# Patient Record
(demographics unavailable — no encounter records)

---

## 2024-10-08 NOTE — PROCEDURE
[Good Fit] : fits well [Pessary Inserted] : inserted [Straight Catheterization] : insertion of a straight catheter [Urinary Tract Infection] : a urinary tract infection [___ Fr Straight Tip] : a [unfilled] in [place] straight tip catheter [None] : there were no complications with the catheter insertion [Clear] : clear [Culture] : culture [Refit] : refit is not needed [Erosion] : no evidence of erosion [Erythema] : no erythema [Discharge] : no vaginal discharge [Infection] : no evidence of infection [FreeTextEntry1] : Vera OMNTANEZ #3 [FreeTextEntry8] : [facility] reviewed

## 2024-10-08 NOTE — HISTORY OF PRESENT ILLNESS
[Uterine Prolapse] : no [Vaginal Wall Prolapse] : no [Unable To Restrain Bowel Movement] : no [Urinary Tract Infection] : no [Urinary Frequency] : no [Feelings Of Urinary Urgency] : no [Pain During Urination (Dysuria)] : no [Constipation Obstructed Defecation] : no [Stool Visible Blood] : no [Incomplete Emptying Of Stool] : no [] : no [Pelvic Pain] : no [Vaginal Pain] : no [Rectal Pain] : no [de-identified] : None with pessary [FreeTextEntry5] : None with pessary [de-identified] : 2-3x/night, not bothersome  [de-identified] : Occasionally  [de-identified] : Not sexually active  [FreeTextEntry1] : Marilyn, 72y/o presents for follow up Stage 4 complete uterovaginal prolapse, cystocele, rectocele. She was last seen on 7/9/24 and had a gellhorn LS #3 reinserted. She had previously tried a RS pessary that fell out. She has been using vaginal estrogen twice a week, no bleeding. She is not sexually active. She denies any discharge, pain, prolapse past the pessary, problems with urinating/defecating. She is happy with her pessary.   PMH: Pancreatic cysts, arrhythmia, chronic lower back pain, hemorrhoids PSH: Cholecystectomy, tonsillectomy Soc: Never smoker, no alcohol consumption, not currently employed, no history of chemical exposures,   FHx: Colon cancer (father, grandfather), uterine? or cervical cancer (grandmother), no history of bladder or  malignancies  Daily fluid intake includes 1 cup decaf coffee + 6-7 cups of water. She drinks until she goes to sleep and reports nocturia 2-3x/night that is not bothersome.

## 2024-10-08 NOTE — DISCUSSION/SUMMARY
[FreeTextEntry1] : Marilyn is a 72yo with Stage 4 uterovaginal prolapse, cystocele, rectocele. She has a gellhorn LS #3 pessary in place. Pessary precautions and instructions reviewed. She will continue using vaginal estrogen, refills sent. we discussed continuing the vaginal estrogen since it has improved her tissue quality significantly. Risks and benefits reviewed. She also uses clotrimazole-betamethasone as needed externally for vulvar irritation. For her urinary frequency, cath urine was sent for culture today.   RTO 3 months for pessary check.

## 2024-10-08 NOTE — PHYSICAL EXAM
[Chaperone Present] : A chaperone was present in the examining room during all aspects of the physical examination [Labia Majora] : were normal [Labia Minora] : were normal [Normal Appearance] : general appearance was normal [Atrophy] : atrophy [Rectocele] : a rectocele [Cystocele] : a cystocele [Uterine Prolapse] : uterine prolapse [No Bleeding] : there was no active vaginal bleeding [Normal] : no abnormalities [Exam Deferred] : was deferred [82374] : A chaperone was present during the pelvic exam. [FreeTextEntry2] : Ashley [FreeTextEntry1] : General: Well, appearing. Alert and orientated. No acute distress HEENT: Normocephalic, atraumatic and extraocular muscles appear to be intact  Neck: Full range of motion, no obvious lymphadenopathy, deformities, or masses noted  Respiratory: Speaking in full sentences comfortably, normal work of breathing and no cough during visit Musculoskeletal: full range of motion  Extremities: No upper extremity edema noted Skin: no obvious rash or skin lesions Neuro: Orientated X 3, speech is fluent, normal rate Psych: Normal mood and affect [Tenderness] : ~T no ~M abdominal tenderness observed [Distended] : not distended

## 2025-01-08 NOTE — PHYSICAL EXAM
[Chaperone Present] : A chaperone was present in the examining room during all aspects of the physical examination [Labia Majora] : were normal [Labia Minora] : were normal [Normal Appearance] : general appearance was normal [Atrophy] : atrophy [Rectocele] : a rectocele [Cystocele] : a cystocele [Uterine Prolapse] : uterine prolapse [No Bleeding] : there was no active vaginal bleeding [Normal] : no abnormalities [Exam Deferred] : was deferred [22166] : A chaperone was present during the pelvic exam. [FreeTextEntry2] : Ashley [FreeTextEntry1] : General: Well, appearing. Alert and orientated. No acute distress HEENT: Normocephalic, atraumatic and extraocular muscles appear to be intact  Neck: Full range of motion, no obvious lymphadenopathy, deformities, or masses noted  Respiratory: Speaking in full sentences comfortably, normal work of breathing and no cough during visit Musculoskeletal: full range of motion  Extremities: No upper extremity edema noted Skin: no obvious rash or skin lesions Neuro: Orientated X 3, speech is fluent, normal rate Psych: Normal mood and affect [Tenderness] : ~T no ~M abdominal tenderness observed [Distended] : not distended

## 2025-01-08 NOTE — DISCUSSION/SUMMARY
[FreeTextEntry1] : Marilyn is a 74yo with Stage 4 uterovaginal prolapse, cystocele, rectocele. She has a gellhorn LS #3 pessary in place. We discussed increasing the size of her pessary or trying a different type since she had some residual prolapse. She is afraid of pain, bleeding with a larger pessary and wants to continue using this one. We discussed that as long as she does not develop urinary obstruction then she can continue using the smaller pessary. Pessary precautions and instructions reviewed. She will continue using vaginal estrogen, refills sent.   RTO 3 months for pessary check.

## 2025-01-08 NOTE — HISTORY OF PRESENT ILLNESS
[Uterine Prolapse] : no [Vaginal Wall Prolapse] : no [Unable To Restrain Bowel Movement] : no [Urinary Tract Infection] : no [Urinary Frequency] : no [Feelings Of Urinary Urgency] : no [Pain During Urination (Dysuria)] : no [Constipation Obstructed Defecation] : no [Stool Visible Blood] : no [Incomplete Emptying Of Stool] : no [] : no [Pelvic Pain] : no [Vaginal Pain] : no [Rectal Pain] : no [de-identified] : None with pessary [FreeTextEntry5] : None with pessary [de-identified] : 2-3x/night, not bothersome  [de-identified] : Occasionally  [de-identified] : Not sexually active  [FreeTextEntry1] : Marilyn, 74y/o presents for follow up Stage 4 complete uterovaginal prolapse, cystocele, rectocele. She was last seen on 10/8/24 and has a gellhorn LS #3. She had previously tried a RS pessary that fell out. She has been using vaginal estrogen twice a week, no bleeding. She is not sexually active. She denies any discharge, pain, problems with urinating/defecating. She has a little prolapse past the pessary but is happy with it.   PMH: Pancreatic cysts, arrhythmia, chronic lower back pain, hemorrhoids PSH: Cholecystectomy, tonsillectomy Soc: Never smoker, no alcohol consumption, not currently employed, no history of chemical exposures,   FHx: Colon cancer (father, grandfather), uterine? or cervical cancer (grandmother), no history of bladder or  malignancies  Daily fluid intake includes 1 cup decaf coffee + 6-7 cups of water. She drinks until she goes to sleep and reports nocturia 2-3x/night that is not bothersome.

## 2025-01-08 NOTE — PROCEDURE
[Straight Catheterization] : insertion of a straight catheter [Urinary Tract Infection] : a urinary tract infection [___ Fr Straight Tip] : a [unfilled] in [place] straight tip catheter [Clear] : clear [Culture] : culture [Pessary Inserted] : inserted [None] : no bleeding [Refit] : refit is not needed [Erosion] : no evidence of erosion [Erythema] : no erythema [Discharge] : no vaginal discharge [Infection] : no evidence of infection [FreeTextEntry1] : Vera MONTANEZ #3 [FreeTextEntry8] : [facility]are reviewed  [de-identified] : Some anterior wall prolapse past the pessary

## 2025-04-15 NOTE — PHYSICAL EXAM
[MA] : MA [Labia Majora] : were normal [Labia Minora] : were normal [Normal Appearance] : general appearance was normal [Atrophy] : atrophy [Rectocele] : a rectocele [Cystocele] : a cystocele [Uterine Prolapse] : uterine prolapse [No Bleeding] : there was no active vaginal bleeding [Normal] : no abnormalities [Exam Deferred] : was deferred [FreeTextEntry2] : Ashley [FreeTextEntry1] : General: Well, appearing. Alert and orientated. No acute distress HEENT: Normocephalic, atraumatic and extraocular muscles appear to be intact  Neck: Full range of motion, no obvious lymphadenopathy, deformities, or masses noted  Respiratory: Speaking in full sentences comfortably, normal work of breathing and no cough during visit Musculoskeletal: full range of motion  Extremities: No upper extremity edema noted Skin: no obvious rash or skin lesions Neuro: Orientated X 3, speech is fluent, normal rate Psych: Normal mood and affect [Tenderness] : ~T no ~M abdominal tenderness observed [Distended] : not distended

## 2025-04-15 NOTE — HISTORY OF PRESENT ILLNESS
[Uterine Prolapse] : no [Vaginal Wall Prolapse] : no [Unable To Restrain Bowel Movement] : no [Urinary Tract Infection] : no [Urinary Frequency] : no [Feelings Of Urinary Urgency] : no [Pain During Urination (Dysuria)] : no [Constipation Obstructed Defecation] : no [Stool Visible Blood] : no [Incomplete Emptying Of Stool] : no [] : no [Pelvic Pain] : no [Vaginal Pain] : no [Rectal Pain] : no [de-identified] : None with pessary [FreeTextEntry5] : None with pessary [de-identified] : 2-3x/night, not bothersome  [de-identified] : Occasionally  [de-identified] : Not sexually active  [FreeTextEntry1] : Marilyn, 72y/o presents for follow up Stage 4 complete uterovaginal prolapse, cystocele, rectocele. She was last seen on 1/8/25 and has a gellhorn LS #3. She had previously tried a RS pessary that fell out. She has been using vaginal estrogen twice a week, no bleeding. She is not sexually active. She denies any discharge, pain, problems with urinating/defecating. She has a little prolapse past the pessary but is happy with it.   PMH: Pancreatic cysts, arrhythmia, chronic lower back pain, hemorrhoids PSH: Cholecystectomy, tonsillectomy Soc: Never smoker, no alcohol consumption, not currently employed, no history of chemical exposures,   FHx: Colon cancer (father, grandfather), uterine? or cervical cancer (grandmother), no history of bladder or  malignancies  Daily fluid intake includes 1 cup decaf coffee + 6-7 cups of water. She drinks until she goes to sleep and reports nocturia 2-3x/night that is not bothersome.

## 2025-04-15 NOTE — PROCEDURE
[Pessary Inserted] : inserted [None] : no bleeding [Refit] : refit is not needed [Erosion] : no evidence of erosion [Erythema] : no erythema [Discharge] : no vaginal discharge [Infection] : no evidence of infection [FreeTextEntry1] : Vera MONTANEZ #3 [de-identified] : Some anterior wall prolapse past the pessary  [FreeTextEntry8] : Pericare reviewed

## 2025-04-15 NOTE — DISCUSSION/SUMMARY
[FreeTextEntry1] : Marilyn is a 72yo with Stage 4 uterovaginal prolapse, cystocele, rectocele. She has a gellhorn LS #3 pessary in place. We discussed increasing the size of her pessary or trying a different type since her current one is small and at risk of flipping or causing pain. She is afraid of bleeding with a larger pessary and wants to continue using this one. We discussed that as long as she does not develop urinary obstruction then she can continue using the smaller pessary. Pessary precautions and instructions reviewed. She will continue using vaginal estrogen, refills sent.   RTO 3 months for pessary check.

## 2025-04-15 NOTE — DISCUSSION/SUMMARY
[FreeTextEntry1] : Marilyn is a 74yo with Stage 4 uterovaginal prolapse, cystocele, rectocele. She has a gellhorn LS #3 pessary in place. We discussed increasing the size of her pessary or trying a different type since her current one is small and at risk of flipping or causing pain. She is afraid of bleeding with a larger pessary and wants to continue using this one. We discussed that as long as she does not develop urinary obstruction then she can continue using the smaller pessary. Pessary precautions and instructions reviewed. She will continue using vaginal estrogen, refills sent.   RTO 3 months for pessary check.

## 2025-04-15 NOTE — PROCEDURE
[Pessary Inserted] : inserted [None] : no bleeding [Refit] : refit is not needed [Erosion] : no evidence of erosion [Erythema] : no erythema [Discharge] : no vaginal discharge [Infection] : no evidence of infection [FreeTextEntry1] : Vera MONTANEZ #3 [FreeTextEntry8] : Pericare reviewed  [de-identified] : Some anterior wall prolapse past the pessary

## 2025-04-15 NOTE — HISTORY OF PRESENT ILLNESS
[Uterine Prolapse] : no [Vaginal Wall Prolapse] : no [Unable To Restrain Bowel Movement] : no [Urinary Tract Infection] : no [Urinary Frequency] : no [Feelings Of Urinary Urgency] : no [Pain During Urination (Dysuria)] : no [Constipation Obstructed Defecation] : no [Stool Visible Blood] : no [Incomplete Emptying Of Stool] : no [] : no [Pelvic Pain] : no [Vaginal Pain] : no [Rectal Pain] : no [de-identified] : None with pessary [FreeTextEntry5] : None with pessary [de-identified] : 2-3x/night, not bothersome  [de-identified] : Occasionally  [de-identified] : Not sexually active  [FreeTextEntry1] : Marilyn, 74y/o presents for follow up Stage 4 complete uterovaginal prolapse, cystocele, rectocele. She was last seen on 1/8/25 and has a gellhorn LS #3. She had previously tried a RS pessary that fell out. She has been using vaginal estrogen twice a week, no bleeding. She is not sexually active. She denies any discharge, pain, problems with urinating/defecating. She has a little prolapse past the pessary but is happy with it.   PMH: Pancreatic cysts, arrhythmia, chronic lower back pain, hemorrhoids PSH: Cholecystectomy, tonsillectomy Soc: Never smoker, no alcohol consumption, not currently employed, no history of chemical exposures,   FHx: Colon cancer (father, grandfather), uterine? or cervical cancer (grandmother), no history of bladder or  malignancies  Daily fluid intake includes 1 cup decaf coffee + 6-7 cups of water. She drinks until she goes to sleep and reports nocturia 2-3x/night that is not bothersome.

## 2025-07-15 NOTE — PROCEDURE
[Pessary Inserted] : inserted [None] : no bleeding [Refit] : refit is not needed [Erosion] : no evidence of erosion [Erythema] : no erythema [Discharge] : no vaginal discharge [Infection] : no evidence of infection [FreeTextEntry1] : Vera MONTANEZ #3 [de-identified] : Some anterior wall prolapse past the pessary  [FreeTextEntry8] : Pericare reviewed

## 2025-07-15 NOTE — HISTORY OF PRESENT ILLNESS
[Uterine Prolapse] : no [Vaginal Wall Prolapse] : no [Unable To Restrain Bowel Movement] : no [Urinary Tract Infection] : no [Urinary Frequency] : no [Feelings Of Urinary Urgency] : no [Pain During Urination (Dysuria)] : no [Constipation Obstructed Defecation] : no [Stool Visible Blood] : no [Incomplete Emptying Of Stool] : no [] : no [Pelvic Pain] : no [Vaginal Pain] : no [Rectal Pain] : no [de-identified] : None with pessary [FreeTextEntry5] : None with pessary [de-identified] : 2-3x/night, not bothersome  [de-identified] : Occasionally  [de-identified] : Not sexually active  [FreeTextEntry1] : Marilyn, 72y/o presents for follow up Stage 4 complete uterovaginal prolapse, cystocele, rectocele. She was last seen on 4/15/25 and has a gellhorn LS #3. She had previously tried a RS pessary that fell out. She has been using vaginal estrogen twice a week, no bleeding. She is not sexually active. She denies any discharge, pain, problems with urinating/defecating. She has a little prolapse past the pessary but is happy with it.   PMH: Pancreatic cysts, arrhythmia, chronic lower back pain, hemorrhoids PSH: Cholecystectomy, tonsillectomy Soc: Never smoker, no alcohol consumption, not currently employed, no history of chemical exposures,   FHx: Colon cancer (father, grandfather), uterine? or cervical cancer (grandmother), no history of bladder or  malignancies  Daily fluid intake includes 1 cup decaf coffee + 6-7 cups of water. She drinks until she goes to sleep and reports nocturia 2-3x/night that is not bothersome.

## 2025-07-15 NOTE — DISCUSSION/SUMMARY
[FreeTextEntry1] : Marilyn is a 74yo with Stage 4 uterovaginal prolapse, cystocele, rectocele. She has a gellhorn LS #3 pessary in place. We discussed increasing the size of her pessary if she no longer feels supported. She is afraid of bleeding with a larger pessary and wants to continue using this one. Pessary precautions and instructions reviewed. She will continue using vaginal estrogen, refills sent at her request.   RTO 3 months for pessary check.

## 2025-07-15 NOTE — PHYSICAL EXAM
[MA] : MA [Labia Majora] : were normal [Labia Minora] : were normal [Normal Appearance] : general appearance was normal [Atrophy] : atrophy [Rectocele] : a rectocele [Cystocele] : a cystocele [Uterine Prolapse] : uterine prolapse [No Bleeding] : there was no active vaginal bleeding [Normal] : no abnormalities [Exam Deferred] : was deferred [FreeTextEntry2] : Kiki [FreeTextEntry1] : General: Well, appearing. Alert and orientated. No acute distress HEENT: Normocephalic, atraumatic and extraocular muscles appear to be intact  Neck: Full range of motion, no obvious lymphadenopathy, deformities, or masses noted  Respiratory: Speaking in full sentences comfortably, normal work of breathing and no cough during visit Musculoskeletal: full range of motion  Extremities: No upper extremity edema noted Skin: no obvious rash or skin lesions Neuro: Orientated X 3, speech is fluent, normal rate Psych: Normal mood and affect [Tenderness] : ~T no ~M abdominal tenderness observed [Distended] : not distended